# Patient Record
Sex: MALE | Race: WHITE | Employment: OTHER | URBAN - METROPOLITAN AREA
[De-identification: names, ages, dates, MRNs, and addresses within clinical notes are randomized per-mention and may not be internally consistent; named-entity substitution may affect disease eponyms.]

---

## 2017-01-24 ENCOUNTER — THERAPY VISIT (OUTPATIENT)
Dept: CHIROPRACTIC MEDICINE | Facility: CLINIC | Age: 62
End: 2017-01-24
Payer: COMMERCIAL

## 2017-01-24 DIAGNOSIS — M99.03 SOMATIC DYSFUNCTION OF LUMBAR REGION: ICD-10-CM

## 2017-01-24 DIAGNOSIS — M99.02 THORACIC SEGMENT DYSFUNCTION: ICD-10-CM

## 2017-01-24 DIAGNOSIS — M54.2 CERVICALGIA: Primary | ICD-10-CM

## 2017-01-24 PROCEDURE — 98941 CHIROPRACT MANJ 3-4 REGIONS: CPT | Performed by: CHIROPRACTOR

## 2017-01-24 NOTE — PROGRESS NOTES
VISIT 1 2017    SUBJECTIVE:  Thierry RTC with overall improvement in neck and back pain. Overall a good month, with mild achy pain neck and lower back.  Lower back and neck pain are gradually increasing in tightness over the last week again. States cold weather and shoveling snow may be contributing.     OBJECTIVE:  C2-3, T5-6 and left and right SIJ dysfunction.   Hypertonic c/t paraspinals, L/S paraspinals B 2/5  ASSESSMENT:   3-region spinal pain   stable  PLAN:   CMT - prone to the mid thoracic, drop x1 to the right and left SIJ, supine to the cervical region  PRN care plan.

## 2017-01-24 NOTE — MR AVS SNAPSHOT
"              After Visit Summary   1/24/2017    Thierry Murrieta    MRN: [id number]           Patient Information     Date Of Birth          1955        Visit Information        Provider Department      1/24/2017 9:00 AM Sintia Bob DC [facility] [place]        Today's Diagnoses     Cervicalgia    -  1     Thoracic segment dysfunction         Somatic dysfunction of lumbar region            Follow-ups after your visit        Your next 10 appointments already scheduled     Feb 21, 2017 10:30 AM   INDRA Chiropractor with Sintia Bob DC   [place] [facility] [place] ([facility] [place]  )    [address]  [address]  [place] [id number] [phone]              Who to contact     If you have questions or need follow up information about today's clinic visit or your schedule please contact [facility] directly at [phone].  Normal or non-critical lab and imaging results will be communicated to you by MyChart, letter or phone within 4 business days after the clinic has received the results. If you do not hear from us within 7 days, please contact the clinic through [employer] or phone. If you have a critical or abnormal lab result, we will notify you by phone as soon as possible.  Submit refill requests through [employer] or call your pharmacy and they will forward the refill request to us. Please allow 3 business days for your refill to be completed.          Additional Information About Your Visit        MyChart Information     [employer] lets you send messages to your doctor, view your test results, renew your prescriptions, schedule appointments and more. To sign up, go to [email or web address] . Click on \"Log in\" on the left side of the screen, which will take you to the Welcome page. Then click on \"Sign up Now\" on the right side of the page.     You will be asked to enter the access code listed below, as well as some personal " information. Please follow the directions to create your username and password.     Your access code is: [email or web address]  Expires: 3/20/2017  9:20 AM     Your access code will  in 90 days. If you need help or a new code, please call your [place] clinic or [phone].        Care EveryWhere ID     This is your Care EveryWhere ID. This could be used by other organizations to access your [place] medical records  [id number]         Blood Pressure from Last 3 Encounters:   No data found for BP    Weight from Last 3 Encounters:   10/03/05 92.987 kg (205 lb)              We Performed the Following     CHIROPRAC MANIP,SPINAL,3-4 REGIONS        Primary Care Provider    None Specified       No primary provider on file.        Thank you!     Thank you for choosing [facility]  for your care. Our goal is always to provide you with excellent care. Hearing back from our patients is one way we can continue to improve our services. Please take a few minutes to complete the written survey that you may receive in the mail after your visit with us. Thank you!             Your Updated Medication List - Protect others around you: Learn how to safely use, store and throw away your medicines at www.disposemymeds.org.      Notice  As of 2017  9:24 AM    You have not been prescribed any medications.

## 2017-02-21 ENCOUNTER — THERAPY VISIT (OUTPATIENT)
Dept: CHIROPRACTIC MEDICINE | Facility: CLINIC | Age: 62
End: 2017-02-21
Payer: COMMERCIAL

## 2017-02-21 DIAGNOSIS — M54.2 CERVICALGIA: Primary | ICD-10-CM

## 2017-02-21 DIAGNOSIS — G89.29 CHRONIC BILATERAL LOW BACK PAIN WITHOUT SCIATICA: ICD-10-CM

## 2017-02-21 DIAGNOSIS — M54.50 CHRONIC BILATERAL LOW BACK PAIN WITHOUT SCIATICA: ICD-10-CM

## 2017-02-21 DIAGNOSIS — M99.02 THORACIC SEGMENT DYSFUNCTION: ICD-10-CM

## 2017-02-21 PROCEDURE — 98941 CHIROPRACT MANJ 3-4 REGIONS: CPT | Mod: AT | Performed by: CHIROPRACTOR

## 2017-02-21 NOTE — PROGRESS NOTES
VISIT 2 2017    SUBJECTIVE:  Thierry RTC with overall improvement in neck and back pain. Overall a good month, with mild achy pain neck and lower back.  Lower back and upper neck pain are gradually increasing in tightness over the last week again.    OBJECTIVE:  C2-3, T5-6 and left and right SIJ dysfunction.   Hypertonic c/t paraspinals, L/S paraspinals B 2/5  ASSESSMENT:   3-region spinal pain   stable  PLAN:   CMT - prone to the mid thoracic, drop x1 to the right and left SIJ, supine to the cervical region  PRN care plan.

## 2017-03-21 ENCOUNTER — THERAPY VISIT (OUTPATIENT)
Dept: CHIROPRACTIC MEDICINE | Facility: CLINIC | Age: 62
End: 2017-03-21
Payer: COMMERCIAL

## 2017-03-21 DIAGNOSIS — G89.29 CHRONIC BILATERAL LOW BACK PAIN WITHOUT SCIATICA: ICD-10-CM

## 2017-03-21 DIAGNOSIS — M54.50 CHRONIC BILATERAL LOW BACK PAIN WITHOUT SCIATICA: ICD-10-CM

## 2017-03-21 DIAGNOSIS — M54.2 CERVICALGIA: Primary | ICD-10-CM

## 2017-03-21 DIAGNOSIS — M99.02 THORACIC SEGMENT DYSFUNCTION: ICD-10-CM

## 2017-03-21 PROCEDURE — 98941 CHIROPRACT MANJ 3-4 REGIONS: CPT | Mod: AT | Performed by: CHIROPRACTOR

## 2017-03-21 NOTE — PROGRESS NOTES
VISIT 3 2017    SUBJECTIVE:  Thierry RTC with overall improvement in neck and back pain. Overall a good month, states having a slight increase stiffness throughout his neck and back. States it might be the cold weather or not being as active.     OBJECTIVE:  C2-3, T5-6 and left and right SIJ dysfunction.   Hypertonic c/t paraspinals, L/S paraspinals B 2/5  ASSESSMENT:   3-region spinal pain   stable  PLAN:   CMT - prone to the mid thoracic, drop x1 to the right and left SIJ, supine to the cervical region  PRN care plan.

## 2017-04-20 ENCOUNTER — THERAPY VISIT (OUTPATIENT)
Dept: CHIROPRACTIC MEDICINE | Facility: CLINIC | Age: 62
End: 2017-04-20
Payer: COMMERCIAL

## 2017-04-20 DIAGNOSIS — M99.02 THORACIC SEGMENT DYSFUNCTION: ICD-10-CM

## 2017-04-20 DIAGNOSIS — M54.50 CHRONIC BILATERAL LOW BACK PAIN WITHOUT SCIATICA: ICD-10-CM

## 2017-04-20 DIAGNOSIS — M54.2 CERVICALGIA: Primary | ICD-10-CM

## 2017-04-20 DIAGNOSIS — G89.29 CHRONIC BILATERAL LOW BACK PAIN WITHOUT SCIATICA: ICD-10-CM

## 2017-04-20 PROCEDURE — 98941 CHIROPRACT MANJ 3-4 REGIONS: CPT | Mod: AT | Performed by: CHIROPRACTOR

## 2017-04-20 NOTE — PROGRESS NOTES
VISIT 4 2017    SUBJECTIVE:  Thierry RTC with overall slight improvement in neck and back pain. Overall a good month, states having a slight increase stiffness throughout his neck and right lower back in the last week. States it might be the wet weather and yard work.     OBJECTIVE:  C2-3, T5-6 and left and right SIJ dysfunction.   Hypertonic c/t paraspinals, L/S paraspinals B 2/5  ASSESSMENT:   3-region spinal pain   stable  PLAN:   CMT - prone to the mid thoracic, drop x1 to the right and left SIJ, supine to the cervical region  PRN care plan.

## 2017-05-18 ENCOUNTER — THERAPY VISIT (OUTPATIENT)
Dept: CHIROPRACTIC MEDICINE | Facility: CLINIC | Age: 62
End: 2017-05-18
Payer: COMMERCIAL

## 2017-05-18 DIAGNOSIS — M54.50 LUMBAGO: ICD-10-CM

## 2017-05-18 DIAGNOSIS — M54.2 CERVICALGIA: Primary | ICD-10-CM

## 2017-05-18 DIAGNOSIS — M99.02 THORACIC SEGMENT DYSFUNCTION: ICD-10-CM

## 2017-05-18 PROCEDURE — 98941 CHIROPRACT MANJ 3-4 REGIONS: CPT | Mod: AT | Performed by: CHIROPRACTOR

## 2017-05-18 NOTE — PROGRESS NOTES
VISIT 6 2017    SUBJECTIVE:  Thierry RTC with overall slight improvement in neck and back pain. Overall a good month, states having a slight increase stiffness throughout his neck and right lower back in the last week. States it might be the wet weather and yard work.     OBJECTIVE:  C2-3, T5-6 and left and right SIJ dysfunction.   Hypertonic c/t paraspinals, L/S paraspinals B 2/5  ASSESSMENT:   3-region spinal pain   stable  PLAN:   CMT - prone to the mid thoracic, drop x1 to the right and left SIJ, supine to the cervical region  PRN care plan.

## 2017-06-22 ENCOUNTER — THERAPY VISIT (OUTPATIENT)
Dept: CHIROPRACTIC MEDICINE | Facility: CLINIC | Age: 62
End: 2017-06-22
Payer: COMMERCIAL

## 2017-06-22 DIAGNOSIS — G89.29 CHRONIC BILATERAL LOW BACK PAIN WITHOUT SCIATICA: ICD-10-CM

## 2017-06-22 DIAGNOSIS — M99.02 THORACIC SEGMENT DYSFUNCTION: ICD-10-CM

## 2017-06-22 DIAGNOSIS — M99.01 CERVICAL SEGMENT DYSFUNCTION: ICD-10-CM

## 2017-06-22 DIAGNOSIS — M54.50 CHRONIC BILATERAL LOW BACK PAIN WITHOUT SCIATICA: ICD-10-CM

## 2017-06-22 DIAGNOSIS — M99.03 SOMATIC DYSFUNCTION OF LUMBAR REGION: Primary | ICD-10-CM

## 2017-06-22 PROCEDURE — 98941 CHIROPRACT MANJ 3-4 REGIONS: CPT | Mod: AT | Performed by: CHIROPRACTOR

## 2017-06-22 NOTE — PROGRESS NOTES
VISIT 7 2017    SUBJECTIVE:  Thierry RTC with a slight increase in neck and back pain. Overall a good month, states having a slight increase stiffness throughout his neck and right lower back in the last week.      OBJECTIVE:  C2-3, T5-6 and left and right SIJ dysfunction.   Hypertonic c/t paraspinals, L/S paraspinals B 2/5  ASSESSMENT:   3-region spinal pain   stable  PLAN:   CMT - prone to the mid thoracic, drop x1 to the right and left SIJ, supine to the cervical region  PRN care plan.

## 2017-07-20 ENCOUNTER — THERAPY VISIT (OUTPATIENT)
Dept: CHIROPRACTIC MEDICINE | Facility: CLINIC | Age: 62
End: 2017-07-20
Payer: COMMERCIAL

## 2017-07-20 DIAGNOSIS — M99.02 THORACIC SEGMENT DYSFUNCTION: ICD-10-CM

## 2017-07-20 DIAGNOSIS — G89.29 CHRONIC BILATERAL LOW BACK PAIN WITHOUT SCIATICA: ICD-10-CM

## 2017-07-20 DIAGNOSIS — M54.50 CHRONIC BILATERAL LOW BACK PAIN WITHOUT SCIATICA: ICD-10-CM

## 2017-07-20 DIAGNOSIS — M54.2 CERVICALGIA: Primary | ICD-10-CM

## 2017-07-20 PROCEDURE — 98941 CHIROPRACT MANJ 3-4 REGIONS: CPT | Mod: AT | Performed by: CHIROPRACTOR

## 2017-07-20 NOTE — PROGRESS NOTES
VISIT 8 2017    SUBJECTIVE:  Thierry RTC with a slight increase in neck and back pain. Overall a good month, states having a slight increase stiffness throughout his neck and right lower back in the last week.      OBJECTIVE:  C2-3, T5-6 and left and right SIJ dysfunction.   Hypertonic c/t paraspinals, L/S paraspinals B 2/5  ASSESSMENT:   3-region spinal pain   stable  PLAN:   CMT - prone to the mid thoracic, drop x1 to the right and left SIJ, supine to the cervical region  PRN care plan.   VISIT 7 2017    SUBJECTIVE:  Thierry RTC with a slight increase in neck and back pain. Overall a good month, states having a slight increase stiffness throughout his neck and right lower back in the last week.      OBJECTIVE:  C2-3, T5-6 and left and right SIJ dysfunction.   Hypertonic c/t paraspinals, L/S paraspinals B 2/5  ASSESSMENT:   3-region spinal pain   stable  PLAN:   CMT - prone to the mid thoracic, drop x1 to the right and left SIJ, supine to the cervical region  PRN care plan.

## 2017-08-24 ENCOUNTER — THERAPY VISIT (OUTPATIENT)
Dept: CHIROPRACTIC MEDICINE | Facility: CLINIC | Age: 62
End: 2017-08-24
Payer: COMMERCIAL

## 2017-08-24 DIAGNOSIS — M54.2 CERVICALGIA: Primary | ICD-10-CM

## 2017-08-24 DIAGNOSIS — M99.03 SOMATIC DYSFUNCTION OF LUMBAR REGION: ICD-10-CM

## 2017-08-24 DIAGNOSIS — M99.02 THORACIC SEGMENT DYSFUNCTION: ICD-10-CM

## 2017-08-24 PROCEDURE — 98941 CHIROPRACT MANJ 3-4 REGIONS: CPT | Mod: AT | Performed by: CHIROPRACTOR

## 2017-08-24 NOTE — PROGRESS NOTES
VISIT 9 2017    SUBJECTIVE:  Thierry RTC with a slight increase in neck and back pain. Overall a good month, states having a slight increase stiffness throughout his neck and right lower back in the last few days. States he did have a massage this past week which was also helpful.      OBJECTIVE:  C2-3, T5-6 and left and right SIJ dysfunction.   Hypertonic c/t paraspinals, L/S paraspinals B 2/5  ASSESSMENT:   3-region spinal pain   stable  PLAN:   CMT - prone to the mid thoracic, drop x1 to the right and left SIJ, supine to the cervical region  PRN care plan.   VISIT 7 2017    SUBJECTIVE:  Thierry RTC with a slight increase in neck and back pain. Overall a good month, states having a slight increase stiffness throughout his neck and right lower back in the last week.      OBJECTIVE:  C2-3, T5-6 and left and right SIJ dysfunction.   Hypertonic c/t paraspinals, L/S paraspinals B 2/5  ASSESSMENT:   3-region spinal pain   stable  PLAN:   CMT - prone to the mid thoracic, drop x1 to the right and left SIJ, supine to the cervical region  PRN care plan.

## 2017-09-21 ENCOUNTER — THERAPY VISIT (OUTPATIENT)
Dept: CHIROPRACTIC MEDICINE | Facility: CLINIC | Age: 62
End: 2017-09-21
Payer: COMMERCIAL

## 2017-09-21 DIAGNOSIS — M99.01 CERVICAL SEGMENT DYSFUNCTION: ICD-10-CM

## 2017-09-21 DIAGNOSIS — M99.02 THORACIC SEGMENT DYSFUNCTION: ICD-10-CM

## 2017-09-21 DIAGNOSIS — M99.03 SOMATIC DYSFUNCTION OF LUMBAR REGION: Primary | ICD-10-CM

## 2017-09-21 DIAGNOSIS — G89.29 CHRONIC BILATERAL LOW BACK PAIN WITHOUT SCIATICA: ICD-10-CM

## 2017-09-21 DIAGNOSIS — M54.50 CHRONIC BILATERAL LOW BACK PAIN WITHOUT SCIATICA: ICD-10-CM

## 2017-09-21 PROCEDURE — 98941 CHIROPRACT MANJ 3-4 REGIONS: CPT | Mod: AT | Performed by: CHIROPRACTOR

## 2017-09-21 NOTE — PROGRESS NOTES
VISIT 10 2017    SUBJECTIVE:  Thierry RTC with a slight improvement in neck and back pain since the last visit. Overall a good month, states having a slight increase stiffness throughout his neck and right lower back in the last few days. States he did have a massage this past week which was also helpful.      OBJECTIVE:  C2-3, T5-6 and left and right SIJ dysfunction.   Hypertonic c/t paraspinals, L/S paraspinals B 2/5  ASSESSMENT:   3-region spinal pain   stable  PLAN:   CMT - prone to the mid thoracic, drop x1 to the right and left SIJ, supine to the cervical region  PRN care plan.   VISIT 7 2017    SUBJECTIVE:  Thierry RTC with a slight increase in neck and back pain. Overall a good month, states having a slight increase stiffness throughout his neck and right lower back in the last week.      OBJECTIVE:  C2-3, T5-6 and left and right SIJ dysfunction.   Hypertonic c/t paraspinals, L/S paraspinals B 2/5  ASSESSMENT:   3-region spinal pain   stable  PLAN:   CMT - prone to the mid thoracic, drop x1 to the right and left SIJ, supine to the cervical region  PRN care plan.

## 2017-10-19 ENCOUNTER — THERAPY VISIT (OUTPATIENT)
Dept: CHIROPRACTIC MEDICINE | Facility: CLINIC | Age: 62
End: 2017-10-19
Payer: COMMERCIAL

## 2017-10-19 DIAGNOSIS — G89.29 CHRONIC BILATERAL LOW BACK PAIN WITHOUT SCIATICA: ICD-10-CM

## 2017-10-19 DIAGNOSIS — M54.2 CERVICALGIA: Primary | ICD-10-CM

## 2017-10-19 DIAGNOSIS — M99.02 THORACIC SEGMENT DYSFUNCTION: ICD-10-CM

## 2017-10-19 DIAGNOSIS — M54.50 CHRONIC BILATERAL LOW BACK PAIN WITHOUT SCIATICA: ICD-10-CM

## 2017-10-19 PROCEDURE — 98941 CHIROPRACT MANJ 3-4 REGIONS: CPT | Mod: AT | Performed by: CHIROPRACTOR

## 2017-10-19 NOTE — PROGRESS NOTES
VISIT 11 2017    SUBJECTIVE:  Thierry RTC with a slight improvement in neck and back pain since the last visit. Overall a good month, states having a slight increase stiffness throughout his neck, mid-back, and right lower back in the last week. Patient reports his left knee is achy/sore and this might be affecting his gait.     OBJECTIVE:  C2-3, T5-6 and left and right SIJ dysfunction.   Hypertonic c/t paraspinals, L/S paraspinals B 2/5  ASSESSMENT:   3-region spinal pain   stable  PLAN:   CMT - prone to the mid thoracic, drop x1 to the right and left SIJ, supine to the cervical region  PRN care plan.   VISIT 7 2017    SUBJECTIVE:  Thierry RTC with a slight increase in neck and back pain. Overall a good month, states having a slight increase stiffness throughout his neck and right lower back in the last week.      OBJECTIVE:  C2-3, T5-6 and left and right SIJ dysfunction.   Hypertonic c/t paraspinals, L/S paraspinals B 2/5  ASSESSMENT:   3-region spinal pain   stable  PLAN:   CMT - prone to the mid thoracic, drop x1 to the right and left SIJ, supine to the cervical region  PRN care plan.

## 2017-11-16 ENCOUNTER — THERAPY VISIT (OUTPATIENT)
Dept: CHIROPRACTIC MEDICINE | Facility: CLINIC | Age: 62
End: 2017-11-16
Payer: COMMERCIAL

## 2017-11-16 DIAGNOSIS — M99.01 CERVICAL SEGMENT DYSFUNCTION: ICD-10-CM

## 2017-11-16 DIAGNOSIS — M99.03 SOMATIC DYSFUNCTION OF LUMBAR REGION: Primary | ICD-10-CM

## 2017-11-16 DIAGNOSIS — M54.50 LUMBAGO: ICD-10-CM

## 2017-11-16 DIAGNOSIS — M54.2 CERVICALGIA: ICD-10-CM

## 2017-11-16 PROCEDURE — 98941 CHIROPRACT MANJ 3-4 REGIONS: CPT | Mod: AT | Performed by: CHIROPRACTOR

## 2017-11-16 NOTE — PROGRESS NOTES
VISIT 12 2017    SUBJECTIVE:  Thierry RTC with a slight improvement in neck and back pain since the last visit. Overall a good month, states having a slight increase stiffness throughout his neck,and lower back in the last week. Patient reports his left knee is achy/sore and this might be affecting his gait. States he did see an orthopedic specialist who did an x-ray. States he said there was nothing surgical and may have some osteoarthritis in the knee. The doctor recommended exercises for the knee.     OBJECTIVE:  C2-3, T5-6 and left and right SIJ dysfunction.   Hypertonic c/t paraspinals, L/S paraspinals B 2/5  ASSESSMENT:   3-region spinal pain   stable  PLAN:   CMT - prone to the mid thoracic, drop x1 to the right and left SIJ, supine to the cervical region  PRN care plan.

## 2017-12-21 ENCOUNTER — THERAPY VISIT (OUTPATIENT)
Dept: CHIROPRACTIC MEDICINE | Facility: CLINIC | Age: 62
End: 2017-12-21
Payer: COMMERCIAL

## 2017-12-21 DIAGNOSIS — M54.2 CERVICALGIA: ICD-10-CM

## 2017-12-21 DIAGNOSIS — M99.02 THORACIC SEGMENT DYSFUNCTION: ICD-10-CM

## 2017-12-21 DIAGNOSIS — M99.03 SOMATIC DYSFUNCTION OF LUMBAR REGION: Primary | ICD-10-CM

## 2017-12-21 DIAGNOSIS — M54.50 LUMBAGO: ICD-10-CM

## 2017-12-21 PROCEDURE — 98941 CHIROPRACT MANJ 3-4 REGIONS: CPT | Mod: AT | Performed by: CHIROPRACTOR

## 2017-12-21 NOTE — PROGRESS NOTES
VISIT 13 2017    SUBJECTIVE:  Thierry RTC with a slight improvement in neck and back pain since the last visit. Overall a good month, states having a slight increase stiffness throughout his neck,and lower back in the last week.     OBJECTIVE:  C2-3, T5-6 and left and right SIJ dysfunction.   Hypertonic c/t paraspinals, L/S paraspinals B 2/5  ASSESSMENT:   3-region spinal pain   stable  PLAN:   CMT - prone to the mid thoracic, drop x1 to the right and left SIJ, supine to the cervical region  PRN care plan.

## 2018-01-23 ENCOUNTER — THERAPY VISIT (OUTPATIENT)
Dept: CHIROPRACTIC MEDICINE | Facility: CLINIC | Age: 63
End: 2018-01-23
Payer: COMMERCIAL

## 2018-01-23 DIAGNOSIS — M54.2 CERVICALGIA: ICD-10-CM

## 2018-01-23 DIAGNOSIS — M54.50 CHRONIC BILATERAL LOW BACK PAIN WITHOUT SCIATICA: Primary | ICD-10-CM

## 2018-01-23 DIAGNOSIS — M99.02 THORACIC SEGMENT DYSFUNCTION: ICD-10-CM

## 2018-01-23 DIAGNOSIS — G89.29 CHRONIC BILATERAL LOW BACK PAIN WITHOUT SCIATICA: Primary | ICD-10-CM

## 2018-01-23 PROCEDURE — 98941 CHIROPRACT MANJ 3-4 REGIONS: CPT | Mod: AT | Performed by: CHIROPRACTOR

## 2018-01-23 NOTE — PROGRESS NOTES
VISIT 1 2018    SUBJECTIVE:  Thierry RTC with a slight improvement in neck and back pain since the last visit. Overall a good month, states having a slight increase stiffness throughout his neck,and lower back in the last week. States having a weekly massage with [facility]. Also started acupuncture in the last week.    OBJECTIVE:  C2-3, T5-6 and left and right SIJ dysfunction.   Hypertonic c/t paraspinals, L/S paraspinals B 2/5  ASSESSMENT:   3-region spinal pain   stable  PLAN:   CMT - prone to the mid thoracic, drop x1 to the right and left SIJ, supine to the cervical region  PRN care plan.

## 2018-02-27 ENCOUNTER — THERAPY VISIT (OUTPATIENT)
Dept: CHIROPRACTIC MEDICINE | Facility: CLINIC | Age: 63
End: 2018-02-27
Payer: COMMERCIAL

## 2018-02-27 DIAGNOSIS — M54.50 CHRONIC BILATERAL LOW BACK PAIN WITHOUT SCIATICA: ICD-10-CM

## 2018-02-27 DIAGNOSIS — M99.02 THORACIC SEGMENT DYSFUNCTION: ICD-10-CM

## 2018-02-27 DIAGNOSIS — M99.01 CERVICAL SEGMENT DYSFUNCTION: ICD-10-CM

## 2018-02-27 DIAGNOSIS — G89.29 CHRONIC BILATERAL LOW BACK PAIN WITHOUT SCIATICA: ICD-10-CM

## 2018-02-27 DIAGNOSIS — M99.03 SOMATIC DYSFUNCTION OF LUMBAR REGION: Primary | ICD-10-CM

## 2018-02-27 DIAGNOSIS — M54.2 CERVICALGIA: ICD-10-CM

## 2018-02-27 PROCEDURE — 98941 CHIROPRACT MANJ 3-4 REGIONS: CPT | Mod: AT | Performed by: CHIROPRACTOR

## 2018-02-27 NOTE — PROGRESS NOTES
VISIT 2 2018    SUBJECTIVE:IRENE IS A PATIENT OF DR. BOB.     Irene RTC with stiffness in the neck and decreased ability to turn the head in either direction. He denies a specific injury or event that may have triggered the issue.  He states it may be due to excessive computer activities. The pt states he feels imbalanced when walking due to decreased ROM in the L sacral area. The pt denies HA..    OBJECTIVE:  C2-3, T5-6 and left and right SIJ dysfunction.   Hypertonic c/t paraspinals, L/S paraspinals B 2/5  ASSESSMENT:   3-region spinal pain   stable  PLAN:   CMT - prone to the mid thoracic, drop x1 to the right and left SIJ, supine to the cervical region  PRN care plan.    Return to Dr. Bob in the following month.

## 2018-04-03 ENCOUNTER — THERAPY VISIT (OUTPATIENT)
Dept: CHIROPRACTIC MEDICINE | Facility: CLINIC | Age: 63
End: 2018-04-03
Payer: COMMERCIAL

## 2018-04-03 DIAGNOSIS — M99.02 THORACIC SEGMENT DYSFUNCTION: ICD-10-CM

## 2018-04-03 DIAGNOSIS — M54.50 LUMBAGO: ICD-10-CM

## 2018-04-03 DIAGNOSIS — M54.2 CERVICALGIA: Primary | ICD-10-CM

## 2018-04-03 PROCEDURE — 98941 CHIROPRACT MANJ 3-4 REGIONS: CPT | Mod: AT | Performed by: CHIROPRACTOR

## 2018-04-03 NOTE — PROGRESS NOTES
VISIT 3 2018    SUBJECTIVE:  Thierry RTC with a slight improvement in neck and back pain since the last visit. Overall a good month, states having a slight increase stiffness throughout his neck,and lower back in the last week. States having a weekly massage with [facility]. Also is doing acupuncture.     OBJECTIVE:  C2-3, T5-6 and left and right SIJ dysfunction.   Hypertonic c/t paraspinals, L/S paraspinals B 2/5  ASSESSMENT:   3-region spinal pain   stable  PLAN:   CMT - prone to the mid thoracic, drop x1 to the right and left SIJ, supine to the cervical region  PRN care plan.

## 2018-05-24 ENCOUNTER — THERAPY VISIT (OUTPATIENT)
Dept: CHIROPRACTIC MEDICINE | Facility: CLINIC | Age: 63
End: 2018-05-24
Payer: COMMERCIAL

## 2018-05-24 DIAGNOSIS — M54.2 CERVICALGIA: ICD-10-CM

## 2018-05-24 DIAGNOSIS — M99.02 THORACIC SEGMENT DYSFUNCTION: ICD-10-CM

## 2018-05-24 DIAGNOSIS — G89.29 CHRONIC BILATERAL LOW BACK PAIN WITHOUT SCIATICA: ICD-10-CM

## 2018-05-24 DIAGNOSIS — M99.01 CERVICAL SEGMENT DYSFUNCTION: Primary | ICD-10-CM

## 2018-05-24 DIAGNOSIS — M54.50 CHRONIC BILATERAL LOW BACK PAIN WITHOUT SCIATICA: ICD-10-CM

## 2018-05-24 PROCEDURE — 98941 CHIROPRACT MANJ 3-4 REGIONS: CPT | Mod: AT | Performed by: CHIROPRACTOR

## 2018-05-24 NOTE — PROGRESS NOTES
VISIT 4 2018    SUBJECTIVE:  Thierry RTC with a slight improvement in neck and back pain since the last visit. Overall a good month, states having a slight increase stiffness throughout his neck,and lower back in the last week. States having a weekly massage with [facility]. Also is doing acupuncture. Patient reports he still has continued bilateral plantar fascitis and achy pain into his legs. States she is working with a physical therapist a the [facility] on these symptoms.     OBJECTIVE:  C2-3, T5-6 and left and right SIJ dysfunction.   Hypertonic c/t paraspinals, L/S paraspinals B 2/5  ASSESSMENT:   3-region spinal pain   stable  PLAN:   CMT - prone to the mid thoracic, drop x1 to the right and left SIJ, supine to the cervical region  PRN care plan.

## 2018-06-21 ENCOUNTER — THERAPY VISIT (OUTPATIENT)
Dept: CHIROPRACTIC MEDICINE | Facility: CLINIC | Age: 63
End: 2018-06-21
Payer: COMMERCIAL

## 2018-06-21 DIAGNOSIS — M54.2 CERVICALGIA: ICD-10-CM

## 2018-06-21 DIAGNOSIS — M54.50 LUMBAGO: ICD-10-CM

## 2018-06-21 DIAGNOSIS — M99.02 THORACIC SEGMENT DYSFUNCTION: ICD-10-CM

## 2018-06-21 DIAGNOSIS — M99.03 SOMATIC DYSFUNCTION OF LUMBAR REGION: Primary | ICD-10-CM

## 2018-06-21 PROCEDURE — 98941 CHIROPRACT MANJ 3-4 REGIONS: CPT | Mod: AT | Performed by: CHIROPRACTOR

## 2018-06-21 NOTE — PROGRESS NOTES
VISIT 5 2018    SUBJECTIVE:  Thierry RTC with a slight improvement in neck and back pain since the last visit. Overall a good month, states having a slight increase stiffness throughout his neck,and lower back in the last week. States having a weekly massage with [facility]. Also is doing acupuncture. Patient reports he still has continued bilateral plantar fascitis and achy pain into his legs. States he is continuing to work with a physical therapist a [facility] [employer] on these symptoms.     OBJECTIVE:  C2-3, T5-6 and left and right SIJ dysfunction.   Hypertonic c/t paraspinals, L/S paraspinals B 2/5  ASSESSMENT:   3-region spinal pain   stable  PLAN:   CMT - prone to the mid thoracic, drop x1 to the right and left SIJ, supine to the cervical region  PRN care plan.

## 2018-07-17 ENCOUNTER — THERAPY VISIT (OUTPATIENT)
Dept: CHIROPRACTIC MEDICINE | Facility: CLINIC | Age: 63
End: 2018-07-17
Payer: COMMERCIAL

## 2018-07-17 DIAGNOSIS — M54.2 CERVICALGIA: Primary | ICD-10-CM

## 2018-07-17 DIAGNOSIS — M99.02 THORACIC SEGMENT DYSFUNCTION: ICD-10-CM

## 2018-07-17 DIAGNOSIS — M54.50 LUMBAGO: ICD-10-CM

## 2018-07-17 PROCEDURE — 98941 CHIROPRACT MANJ 3-4 REGIONS: CPT | Mod: AT | Performed by: CHIROPRACTOR

## 2018-07-17 NOTE — PROGRESS NOTES
VISIT 6 2018    SUBJECTIVE:  Thierry RTC with a slight improvement in neck and back pain since the last visit. Overall a good month, states having a slight increase stiffness throughout his neck,and lower back in the last week. States having a weekly massage with [facility]. Also is doing acupuncture. Patient reports he still been working on bilateral plantar fascitis and achy pain into his legs in physical therapy.    OBJECTIVE:  C2-3, T5-6 and left and right SIJ dysfunction.   Hypertonic c/t paraspinals, L/S paraspinals B 2/5  ASSESSMENT:   3-region spinal pain   stable  PLAN:   CMT - prone to the mid thoracic, drop x1 to the right and left SIJ, supine to the cervical region  PRN care plan.

## 2018-08-16 ENCOUNTER — THERAPY VISIT (OUTPATIENT)
Dept: CHIROPRACTIC MEDICINE | Facility: CLINIC | Age: 63
End: 2018-08-16
Payer: COMMERCIAL

## 2018-08-16 DIAGNOSIS — M54.2 CERVICALGIA: Primary | ICD-10-CM

## 2018-08-16 DIAGNOSIS — M54.50 LUMBAGO: ICD-10-CM

## 2018-08-16 DIAGNOSIS — M99.02 THORACIC SEGMENT DYSFUNCTION: ICD-10-CM

## 2018-08-16 PROCEDURE — 98941 CHIROPRACT MANJ 3-4 REGIONS: CPT | Mod: AT | Performed by: CHIROPRACTOR

## 2018-08-16 NOTE — PROGRESS NOTES
VISIT 7 2018    SUBJECTIVE:  Thierry RTC with a slight improvement in neck and back pain since the last visit. Overall a good month, states having a slight increase stiffness throughout his neck,and lower back in the last week. States having a weekly massage with [facility]. Also is doing acupuncture. Patient reports overall improvement.     OBJECTIVE:  C2-3, T5-6 and left and right SIJ dysfunction.   Hypertonic c/t paraspinals, L/S paraspinals B 2/5  ASSESSMENT:   3-region spinal pain   stable  PLAN:   CMT - prone to the mid thoracic, drop x1 to the right and left SIJ, supine to the cervical region  PRN care plan.

## 2018-09-20 ENCOUNTER — THERAPY VISIT (OUTPATIENT)
Dept: CHIROPRACTIC MEDICINE | Facility: CLINIC | Age: 63
End: 2018-09-20
Payer: COMMERCIAL

## 2018-09-20 DIAGNOSIS — M54.50 LUMBAGO: ICD-10-CM

## 2018-09-20 DIAGNOSIS — M54.2 CERVICALGIA: Primary | ICD-10-CM

## 2018-09-20 DIAGNOSIS — M99.02 THORACIC SEGMENT DYSFUNCTION: ICD-10-CM

## 2018-09-20 PROCEDURE — 98941 CHIROPRACT MANJ 3-4 REGIONS: CPT | Mod: AT | Performed by: CHIROPRACTOR

## 2018-09-20 NOTE — PROGRESS NOTES
VISIT 8 2018    SUBJECTIVE:  Thierry RTC with a slight improvement in neck and back pain since the last visit. Overall a good month, states having a slight increase stiffness throughout his neck,and lower back in the last week. States having a weekly massage with [facility]. Also is doing acupuncture. Patient reports overall improvement. Patient reports having continued knee pain, states he is doing his exercises given in PT.  We discussed he see his PCP about this.     OBJECTIVE:  C2-3, T5-6 and left and right SIJ dysfunction.   Hypertonic c/t paraspinals, L/S paraspinals B 2/5  ASSESSMENT:   3-region spinal pain   stable  PLAN:   CMT - prone to the mid thoracic, drop x1 to the right and left SIJ, supine to the cervical region  PRN care plan.

## 2018-10-16 ENCOUNTER — THERAPY VISIT (OUTPATIENT)
Dept: CHIROPRACTIC MEDICINE | Facility: CLINIC | Age: 63
End: 2018-10-16
Payer: COMMERCIAL

## 2018-10-16 DIAGNOSIS — M99.03 SOMATIC DYSFUNCTION OF LUMBAR REGION: ICD-10-CM

## 2018-10-16 DIAGNOSIS — M54.2 CERVICALGIA: Primary | ICD-10-CM

## 2018-10-16 DIAGNOSIS — M99.02 THORACIC SEGMENT DYSFUNCTION: ICD-10-CM

## 2018-10-16 DIAGNOSIS — M54.50 LUMBAGO: ICD-10-CM

## 2018-10-16 PROCEDURE — 98941 CHIROPRACT MANJ 3-4 REGIONS: CPT | Mod: AT | Performed by: CHIROPRACTOR

## 2018-10-16 NOTE — PROGRESS NOTES
VISIT 9 2018    SUBJECTIVE:  Thierry RTC with a slight improvement in neck and back pain since the last visit. Overall a good month, states having a slight increase stiffness throughout his neck,and lower back in the last week. States having a weekly massage with [facility]. Also is doing acupuncture. Patient reports overall improvement. Patient reports he has switched shoes and that has made dramatic improvement with his plantar fascitis and knee pain.     OBJECTIVE:  C2-3, T5-6 and left and right SIJ dysfunction.   Hypertonic c/t paraspinals, L/S paraspinals B 2/5  ASSESSMENT:   3-region spinal pain   stable  PLAN:   CMT - prone to the mid thoracic, drop x1 to the right and left SIJ, supine to the cervical region  PRN care plan.

## 2018-11-20 ENCOUNTER — THERAPY VISIT (OUTPATIENT)
Dept: CHIROPRACTIC MEDICINE | Facility: CLINIC | Age: 63
End: 2018-11-20
Payer: COMMERCIAL

## 2018-11-20 DIAGNOSIS — G89.29 CHRONIC BILATERAL LOW BACK PAIN WITHOUT SCIATICA: Primary | ICD-10-CM

## 2018-11-20 DIAGNOSIS — M99.02 THORACIC SEGMENT DYSFUNCTION: ICD-10-CM

## 2018-11-20 DIAGNOSIS — M54.2 CERVICALGIA: ICD-10-CM

## 2018-11-20 DIAGNOSIS — M54.50 CHRONIC BILATERAL LOW BACK PAIN WITHOUT SCIATICA: Primary | ICD-10-CM

## 2018-11-20 PROCEDURE — 98941 CHIROPRACT MANJ 3-4 REGIONS: CPT | Mod: AT | Performed by: CHIROPRACTOR

## 2018-11-20 NOTE — PROGRESS NOTES
VISIT 10 2018    SUBJECTIVE:  Thierry RTC with a slight improvement in neck and back pain since the last visit. Overall a good month, states having a slight increase stiffness throughout his neck,and lower back in the last week. States having a weekly massage with [facility]. Also is doing acupuncture. Patient reports overall improvement. Patient reports after recent plane flight and travel having a slight increase in neck and lower back pain.     OBJECTIVE:  C2-3, T5-6 and left and right SIJ dysfunction.   Hypertonic c/t paraspinals, L/S paraspinals B 2/5  ASSESSMENT:   3-region spinal pain   stable  PLAN:   CMT - prone to the mid thoracic, drop x1 to the right and left SIJ, supine to the cervical region  PRN care plan.

## 2018-12-20 ENCOUNTER — THERAPY VISIT (OUTPATIENT)
Dept: CHIROPRACTIC MEDICINE | Facility: CLINIC | Age: 63
End: 2018-12-20
Payer: COMMERCIAL

## 2018-12-20 DIAGNOSIS — M54.2 CERVICALGIA: Primary | ICD-10-CM

## 2018-12-20 DIAGNOSIS — M99.02 THORACIC SEGMENT DYSFUNCTION: ICD-10-CM

## 2018-12-20 DIAGNOSIS — M54.50 LUMBAGO: ICD-10-CM

## 2018-12-20 PROCEDURE — 98941 CHIROPRACT MANJ 3-4 REGIONS: CPT | Mod: AT | Performed by: CHIROPRACTOR

## 2018-12-20 NOTE — PROGRESS NOTES
VISIT 11 2018    SUBJECTIVE:  Thierry RTC with a slight improvement in neck and back pain since the last visit. Overall a good month, states having a slight increase stiffness throughout his neck,and lower back in the last week. States having a weekly massage with [facility]. Also is doing acupuncture. Patient reports overall improvement. Patient reports after recent plane flight and travel having a slight increase in neck and lower back pain.     OBJECTIVE:  C2-3, T5-6 and left and right SIJ dysfunction.   Hypertonic c/t paraspinals, L/S paraspinals B 2/5  ASSESSMENT:   3-region spinal pain   stable  PLAN:   CMT - prone to the mid thoracic, drop x1 to the right and left SIJ, supine to the cervical region  PRN care plan.

## 2019-01-17 ENCOUNTER — THERAPY VISIT (OUTPATIENT)
Dept: CHIROPRACTIC MEDICINE | Facility: CLINIC | Age: 64
End: 2019-01-17
Payer: COMMERCIAL

## 2019-01-17 DIAGNOSIS — M54.2 CERVICALGIA: Primary | ICD-10-CM

## 2019-01-17 DIAGNOSIS — M54.50 CHRONIC BILATERAL LOW BACK PAIN WITHOUT SCIATICA: ICD-10-CM

## 2019-01-17 DIAGNOSIS — M99.02 THORACIC SEGMENT DYSFUNCTION: ICD-10-CM

## 2019-01-17 DIAGNOSIS — G89.29 CHRONIC BILATERAL LOW BACK PAIN WITHOUT SCIATICA: ICD-10-CM

## 2019-01-17 PROCEDURE — 98941 CHIROPRACT MANJ 3-4 REGIONS: CPT | Mod: AT | Performed by: CHIROPRACTOR

## 2019-01-17 NOTE — PROGRESS NOTES
VISIT 12 2018    SUBJECTIVE:  Thierry RTC with a slight improvement in neck and back pain since the last visit. Overall a good month, states having improvement with stiffness throughout his neck,and lower back. States having a monthly massage with [facility]. Also is doing acupuncture. Patient reports overall improvement.      OBJECTIVE:  C2-3, T5-6 and left and right SIJ dysfunction.   Hypertonic c/t paraspinals, L/S paraspinals B 2/5  ASSESSMENT:   3-region spinal pain   stable  PLAN:   CMT - prone to the mid thoracic, drop x1 to the right and left SIJ, supine to the cervical region  PRN care plan.

## 2019-02-21 ENCOUNTER — THERAPY VISIT (OUTPATIENT)
Dept: CHIROPRACTIC MEDICINE | Facility: CLINIC | Age: 64
End: 2019-02-21
Payer: COMMERCIAL

## 2019-02-21 DIAGNOSIS — G89.29 CHRONIC BILATERAL LOW BACK PAIN WITHOUT SCIATICA: ICD-10-CM

## 2019-02-21 DIAGNOSIS — M99.02 THORACIC SEGMENT DYSFUNCTION: ICD-10-CM

## 2019-02-21 DIAGNOSIS — M99.01 CERVICAL SEGMENT DYSFUNCTION: Primary | ICD-10-CM

## 2019-02-21 DIAGNOSIS — M54.2 CERVICALGIA: ICD-10-CM

## 2019-02-21 DIAGNOSIS — M54.50 CHRONIC BILATERAL LOW BACK PAIN WITHOUT SCIATICA: ICD-10-CM

## 2019-02-21 PROCEDURE — 98941 CHIROPRACT MANJ 3-4 REGIONS: CPT | Mod: AT | Performed by: CHIROPRACTOR

## 2019-02-21 NOTE — PROGRESS NOTES
VISIT 2 2019    SUBJECTIVE:  Thierry RTC with a slight improvement in neck and back pain since the last visit. Overall a good month, states having improvement with stiffness throughout his neck,and lower back. States having a monthly massage with [facility]. Also is doing acupuncture. Patient reports overall improvement.      OBJECTIVE:  C2-3, T5-6 and left and right SIJ dysfunction.   Hypertonic c/t paraspinals, L/S paraspinals B 2/5  ASSESSMENT:   3-region spinal pain   stable  PLAN:   CMT - prone to the mid thoracic, drop x1 to the right and left SIJ, supine to the cervical region  PRN care plan.

## 2019-04-02 ENCOUNTER — THERAPY VISIT (OUTPATIENT)
Dept: CHIROPRACTIC MEDICINE | Facility: CLINIC | Age: 64
End: 2019-04-02
Payer: COMMERCIAL

## 2019-04-02 DIAGNOSIS — M99.02 THORACIC SEGMENT DYSFUNCTION: ICD-10-CM

## 2019-04-02 DIAGNOSIS — M99.03 SOMATIC DYSFUNCTION OF LUMBAR REGION: ICD-10-CM

## 2019-04-02 DIAGNOSIS — M99.01 CERVICAL SEGMENT DYSFUNCTION: Primary | ICD-10-CM

## 2019-04-02 DIAGNOSIS — M54.2 CERVICALGIA: ICD-10-CM

## 2019-04-02 PROCEDURE — 98941 CHIROPRACT MANJ 3-4 REGIONS: CPT | Mod: AT | Performed by: CHIROPRACTOR

## 2019-04-02 NOTE — PROGRESS NOTES
VISIT 3 2019    SUBJECTIVE:  Thierry RTC with a slight improvement in neck and back pain since the last visit. Overall a good month, states having improvement with stiffness throughout his neck,and lower back. States having a monthly massage with [facility]. Also is doing acupuncture. Patient reports overall improvement.      OBJECTIVE:  C2-3, T5-6 and left and right SIJ dysfunction.   Hypertonic c/t paraspinals, L/S paraspinals B 2/5  ASSESSMENT:   3-region spinal pain   stable  PLAN:   CMT - prone to the mid thoracic, drop x1 to the right and left SIJ, supine to the cervical region  PRN care plan.

## 2019-05-07 ENCOUNTER — THERAPY VISIT (OUTPATIENT)
Dept: CHIROPRACTIC MEDICINE | Facility: CLINIC | Age: 64
End: 2019-05-07
Payer: COMMERCIAL

## 2019-05-07 DIAGNOSIS — M99.01 CERVICAL SEGMENT DYSFUNCTION: Primary | ICD-10-CM

## 2019-05-07 DIAGNOSIS — M54.50 LUMBAGO: ICD-10-CM

## 2019-05-07 DIAGNOSIS — M99.02 THORACIC SEGMENT DYSFUNCTION: ICD-10-CM

## 2019-05-07 DIAGNOSIS — M54.2 CERVICALGIA: ICD-10-CM

## 2019-05-07 PROCEDURE — 98941 CHIROPRACT MANJ 3-4 REGIONS: CPT | Mod: AT | Performed by: CHIROPRACTOR

## 2019-05-07 NOTE — PROGRESS NOTES
VISIT 4 2019    SUBJECTIVE:  Thierry RTC with a slight improvement in neck and back pain since the last visit. Overall a good month, states having improvement with stiffness throughout his neck,and lower back. States having a monthly massage with [facility]. Also is doing acupuncture. Patient reports overall improvement.      OBJECTIVE:  C2-3, T5-6 and left and right SIJ dysfunction.   Hypertonic c/t paraspinals, L/S paraspinals B 2/5  ASSESSMENT:   3-region spinal pain   stable  PLAN:   CMT - prone to the mid thoracic, drop x1 to the right and left SIJ, supine to the cervical region  PRN care plan.

## 2019-06-18 ENCOUNTER — THERAPY VISIT (OUTPATIENT)
Dept: CHIROPRACTIC MEDICINE | Facility: CLINIC | Age: 64
End: 2019-06-18
Payer: COMMERCIAL

## 2019-06-18 DIAGNOSIS — M99.02 THORACIC SEGMENT DYSFUNCTION: ICD-10-CM

## 2019-06-18 DIAGNOSIS — M99.01 CERVICAL SEGMENT DYSFUNCTION: ICD-10-CM

## 2019-06-18 DIAGNOSIS — G89.29 CHRONIC BILATERAL LOW BACK PAIN WITHOUT SCIATICA: ICD-10-CM

## 2019-06-18 DIAGNOSIS — M99.03 SOMATIC DYSFUNCTION OF LUMBAR REGION: Primary | ICD-10-CM

## 2019-06-18 DIAGNOSIS — M54.50 CHRONIC BILATERAL LOW BACK PAIN WITHOUT SCIATICA: ICD-10-CM

## 2019-06-18 DIAGNOSIS — M99.04 SOMATIC DYSFUNCTION OF SACRAL REGION: ICD-10-CM

## 2019-06-18 PROCEDURE — 98941 CHIROPRACT MANJ 3-4 REGIONS: CPT | Mod: AT | Performed by: CHIROPRACTOR

## 2019-06-18 NOTE — PROGRESS NOTES
Visit #:  5/10    Subjective:  Thierry Murrieta is a 63 year old male who is seen in f/u up for: chronic on/off neck and back pain     Data Unavailable.     Since last visit on 5/7/2019,  Thierry Murrieta reports:    Area of chief complaint:  Cervical, Thoracic and Lumbar :  Symptoms are graded at 3/10. The quality is described as stiff, achey, dull.  Motion has increased, but is still not normal, C6, T5, L5,SIJ. Patient feels that they are slightly improved due to a reduction in symptoms. Patient reports neck has been improved. Lower back more sore over the last week again after starting new exercises.     Since last visit the patient feels that they are 20 percent  improved from last visit.       Objective:  The following was observed:    P: palpatory tenderness Lumbar erector spine and Traps Bilaterally  A: static palpation demonstrates intersegmental asymmetry , cervical, thoracic, lumbar, pelvis  R: motion palpation notes restricted motion, C4 , C6 , T5 , T8 , T10, L5  and Sacrum   T: muscle spasm at level(s): Lumbar erector spine, T-spine paraspinal and Traps Bilaterally    Segmental spinal dysfunction/restrictions found at:  C4 , C6 , T4 , T8 , L5  and Sacrum       Assessment:    Diagnoses:    No diagnosis found.    Patient's condition:  Patient had restrictions pre-manipulation    Treatment effectiveness:  Post manipulation there is better intersegmental movement and Patient claims to feel looser post manipulation      Procedures:  CMT:  87575 Chiropractic manipulative treatment 3-4 regions performed   Cervical: Diversified, C4, C6, Supine  Thoracic: Diversified, T5, T8, Prone  Pelvis: Drop Table L5, PSIS Left , PSIS Right , Prone    Modalities:  None performed this visit    Therapeutic procedures:  None    Response to Treatment  Patient tolerated the treatment well today.    Prognosis: Good    Progress towards Goals: Patient is making progress towards the goal.     Recommendations:    Instructions:  ice 20  minutes every other hour as needed    Follow-up:    Return to care in 3-4 weeks if needed. PRN plan.

## 2019-09-19 ENCOUNTER — THERAPY VISIT (OUTPATIENT)
Dept: CHIROPRACTIC MEDICINE | Facility: CLINIC | Age: 64
End: 2019-09-19
Payer: COMMERCIAL

## 2019-09-19 DIAGNOSIS — M54.2 CERVICALGIA: ICD-10-CM

## 2019-09-19 DIAGNOSIS — M99.04 SOMATIC DYSFUNCTION OF SACRAL REGION: ICD-10-CM

## 2019-09-19 DIAGNOSIS — M99.02 THORACIC SEGMENT DYSFUNCTION: ICD-10-CM

## 2019-09-19 DIAGNOSIS — M99.03 SOMATIC DYSFUNCTION OF LUMBAR REGION: Primary | ICD-10-CM

## 2019-09-19 DIAGNOSIS — M99.01 CERVICAL SEGMENT DYSFUNCTION: ICD-10-CM

## 2019-09-19 DIAGNOSIS — M54.50 CHRONIC BILATERAL LOW BACK PAIN WITHOUT SCIATICA: ICD-10-CM

## 2019-09-19 DIAGNOSIS — G89.29 CHRONIC BILATERAL LOW BACK PAIN WITHOUT SCIATICA: ICD-10-CM

## 2019-09-19 PROCEDURE — 98941 CHIROPRACT MANJ 3-4 REGIONS: CPT | Mod: AT | Performed by: CHIROPRACTOR

## 2019-09-19 NOTE — PROGRESS NOTES
Visit #:  6/10    Subjective:  Thierry Murrieta is a 63 year old male who is seen in f/u up for: chronic on/off neck and back pain        Somatic dysfunction of lumbar region  Chronic bilateral low back pain without sciatica  Cervical segment dysfunction  Cervicalgia  Thoracic segment dysfunction  Somatic dysfunction of sacral region.     Since last visit,  Thierry Murrieta reports:    Area of chief complaint:  Cervical, Thoracic and Lumbar :  Symptoms are graded at 3/10. The quality is described as stiff, achey, dull.  Motion has increased, but is still not normal, C6, T5, L5,SIJ. Patient feels that they are slightly improved due to a reduction in symptoms. Patient reports stiffness in the neck area from sitting at the computer. He has been performing household chores which may be contributing. No other notable sx.     Since last visit the patient feels that they are 15 percent  improved from last visit.       Objective:  The following was observed:    P: palpatory tenderness Lumbar erector spine and Traps Bilaterally  A: static palpation demonstrates intersegmental asymmetry , cervical, thoracic, lumbar, pelvis  R: motion palpation notes restricted motion, C4 , C6 , T5 , T8 , T10, L5  and Sacrum   T: muscle spasm at level(s): Lumbar erector spine, T-spine paraspinal and Traps Bilaterally    Segmental spinal dysfunction/restrictions found at:  C4 , C6 , T4 , T8 , L5  and Sacrum       Assessment:    Diagnoses:      1. Somatic dysfunction of lumbar region    2. Chronic bilateral low back pain without sciatica    3. Cervical segment dysfunction    4. Cervicalgia    5. Thoracic segment dysfunction    6. Somatic dysfunction of sacral region        Patient's condition:  Patient had restrictions pre-manipulation    Treatment effectiveness:  Post manipulation there is better intersegmental movement and Patient claims to feel looser post manipulation      Procedures:  CMT:  11726 Chiropractic manipulative treatment 3-4  regions performed   Cervical: Diversified, C4, C6, Supine  Thoracic: Diversified, T5, T8, Prone  Pelvis: Drop Table L5, PSIS Left , PSIS Right , Prone    Modalities:  None performed this visit    Therapeutic procedures:  None    Response to Treatment  Patient tolerated the treatment well today.    Prognosis: Good    Progress towards Goals: Patient is making progress towards the goal.     Recommendations:    Instructions:  ice 20 minutes every other hour as needed    Follow-up:    Return to care in 3-4 weeks if needed. PRN plan.

## 2019-09-30 ENCOUNTER — HEALTH MAINTENANCE LETTER (OUTPATIENT)
Age: 64
End: 2019-09-30

## 2019-10-29 ENCOUNTER — THERAPY VISIT (OUTPATIENT)
Dept: CHIROPRACTIC MEDICINE | Facility: CLINIC | Age: 64
End: 2019-10-29
Payer: COMMERCIAL

## 2019-10-29 DIAGNOSIS — M54.2 CERVICALGIA: ICD-10-CM

## 2019-10-29 DIAGNOSIS — M99.01 CERVICAL SEGMENT DYSFUNCTION: Primary | ICD-10-CM

## 2019-10-29 DIAGNOSIS — G89.29 CHRONIC BILATERAL LOW BACK PAIN WITHOUT SCIATICA: ICD-10-CM

## 2019-10-29 DIAGNOSIS — M54.50 CHRONIC BILATERAL LOW BACK PAIN WITHOUT SCIATICA: ICD-10-CM

## 2019-10-29 DIAGNOSIS — M99.02 THORACIC SEGMENT DYSFUNCTION: ICD-10-CM

## 2019-10-29 PROCEDURE — 98941 CHIROPRACT MANJ 3-4 REGIONS: CPT | Mod: AT | Performed by: CHIROPRACTOR

## 2019-10-29 NOTE — PROGRESS NOTES
Visit #:  7/10    Subjective:  Thierry Murrieta is a 63 year old male who is seen in f/u up for: chronic on/off neck and back pain     Data Unavailable.     Since last visit on 9/19/19,  Thierry Murrieta reports:    Area of chief complaint:  Cervical, Thoracic and Lumbar :  Symptoms are graded at 3/10. The quality is described as stiff, achey, dull.  Motion has increased, but is still not normal, C6, T5, L5,SIJ. Patient feels that they are slightly improved due to a reduction in symptoms. Patient reports neck and lower back have been improving.      Since last visit the patient feels that they are 20 percent  improved from last visit.       Objective:  The following was observed:    P: palpatory tenderness Lumbar erector spine and Traps Bilaterally  A: static palpation demonstrates intersegmental asymmetry , cervical, thoracic, lumbar, pelvis  R: motion palpation notes restricted motion, C4 , C6 , T5 , T8 , T10, L5  and Sacrum   T: muscle spasm at level(s): Lumbar erector spine, T-spine paraspinal and Traps Bilaterally    Segmental spinal dysfunction/restrictions found at:  C4 , C6 , T4 , T8 , L5  and Sacrum       Assessment:    Diagnoses:    No diagnosis found.    Patient's condition:  Patient had restrictions pre-manipulation    Treatment effectiveness:  Post manipulation there is better intersegmental movement and Patient claims to feel looser post manipulation      Procedures:  CMT:  34395 Chiropractic manipulative treatment 3-4 regions performed   Cervical: Activator, C4, C6, Supine  Thoracic: Diversified/Activator, T5, T8, Prone  Pelvis: Drop Table L5, PSIS Left , PSIS Right , Prone    Modalities:  None performed this visit    Therapeutic procedures:  None    Response to Treatment  Patient tolerated the treatment well today.    Prognosis: Good    Progress towards Goals: Patient is making progress towards the goal.     Recommendations:    Instructions:  ice 20 minutes every other hour as needed    Follow-up:     Return to care in 3-4 weeks if needed. PRN plan.

## 2019-11-26 ENCOUNTER — THERAPY VISIT (OUTPATIENT)
Dept: CHIROPRACTIC MEDICINE | Facility: CLINIC | Age: 64
End: 2019-11-26
Payer: COMMERCIAL

## 2019-11-26 DIAGNOSIS — M99.02 THORACIC SEGMENT DYSFUNCTION: ICD-10-CM

## 2019-11-26 DIAGNOSIS — M54.2 CERVICALGIA: ICD-10-CM

## 2019-11-26 DIAGNOSIS — M99.01 CERVICAL SEGMENT DYSFUNCTION: Primary | ICD-10-CM

## 2019-11-26 DIAGNOSIS — M54.50 LUMBAGO: ICD-10-CM

## 2019-11-26 PROCEDURE — 98941 CHIROPRACT MANJ 3-4 REGIONS: CPT | Mod: AT | Performed by: CHIROPRACTOR

## 2020-02-06 ENCOUNTER — THERAPY VISIT (OUTPATIENT)
Dept: CHIROPRACTIC MEDICINE | Facility: CLINIC | Age: 65
End: 2020-02-06
Payer: COMMERCIAL

## 2020-02-06 DIAGNOSIS — G89.29 CHRONIC BILATERAL LOW BACK PAIN WITHOUT SCIATICA: ICD-10-CM

## 2020-02-06 DIAGNOSIS — M99.04 SOMATIC DYSFUNCTION OF SACRAL REGION: ICD-10-CM

## 2020-02-06 DIAGNOSIS — M99.01 CERVICAL SEGMENT DYSFUNCTION: Primary | ICD-10-CM

## 2020-02-06 DIAGNOSIS — M99.02 THORACIC SEGMENT DYSFUNCTION: ICD-10-CM

## 2020-02-06 DIAGNOSIS — M54.50 CHRONIC BILATERAL LOW BACK PAIN WITHOUT SCIATICA: ICD-10-CM

## 2020-02-06 DIAGNOSIS — M54.2 CERVICALGIA: ICD-10-CM

## 2020-02-06 PROCEDURE — 98941 CHIROPRACT MANJ 3-4 REGIONS: CPT | Mod: AT | Performed by: CHIROPRACTOR

## 2020-02-06 NOTE — PROGRESS NOTES
Visit #:  1/10   2020    Subjective:  Thierry Murrieta is a 63 year old male who is seen in f/u up for: chronic on/off neck and back pain     Data Unavailable.     Since last visit on 10/29/19  Thierry Murrieta reports:    Area of chief complaint:  Cervical, Thoracic and Lumbar :  Symptoms are graded at 3/10. The quality is described as stiff, achey, dull.  Motion has increased, but is still not normal, C6, T5, L5,SIJ. Patient feels that they are slightly improved due to a reduction in symptoms. Patient reports neck and lower back have been improving.  Patient states she will be getting a partial right knee replacement early March 2020.    Since last visit the patient feels that they are 20 percent  improved from last visit.       Objective:  The following was observed:    P: palpatory tenderness Lumbar erector spine and Traps Bilaterally  A: static palpation demonstrates intersegmental asymmetry , cervical, thoracic, lumbar, pelvis  R: motion palpation notes restricted motion, C4 , C6 , T5 , T8 , T10, L5  and Sacrum   T: muscle spasm at level(s): Lumbar erector spine, T-spine paraspinal and Traps Bilaterally    Segmental spinal dysfunction/restrictions found at:  C4 , C6 , T4 , T8 , L5  and Sacrum       Assessment:    Diagnoses:    No diagnosis found.    Patient's condition:  Patient had restrictions pre-manipulation    Treatment effectiveness:  Post manipulation there is better intersegmental movement and Patient claims to feel looser post manipulation      Procedures:  CMT:  92982 Chiropractic manipulative treatment 3-4 regions performed   Cervical: Activator, C4, C6, Supine  Thoracic: Diversified/Activator, T5, T8, Prone  Pelvis: Drop Table L5, PSIS Left , PSIS Right , Prone    Modalities:  None performed this visit    Therapeutic procedures:  None    Response to Treatment  Patient tolerated the treatment well today.    Prognosis: Good    Progress towards Goals: Patient is making progress towards the  goal.     Recommendations:    Instructions:  ice 20 minutes every other hour as needed    Follow-up:    Return to care in 3-4 weeks if needed. PRN plan.

## 2020-06-29 ENCOUNTER — THERAPY VISIT (OUTPATIENT)
Dept: CHIROPRACTIC MEDICINE | Facility: CLINIC | Age: 65
End: 2020-06-29
Payer: COMMERCIAL

## 2020-06-29 DIAGNOSIS — M99.02 THORACIC SEGMENT DYSFUNCTION: ICD-10-CM

## 2020-06-29 DIAGNOSIS — M99.03 SOMATIC DYSFUNCTION OF LUMBAR REGION: ICD-10-CM

## 2020-06-29 DIAGNOSIS — M99.04 SOMATIC DYSFUNCTION OF SACRAL REGION: ICD-10-CM

## 2020-06-29 DIAGNOSIS — M99.01 CERVICAL SEGMENT DYSFUNCTION: Primary | ICD-10-CM

## 2020-06-29 DIAGNOSIS — M54.2 CERVICALGIA: ICD-10-CM

## 2020-06-29 PROCEDURE — 98941 CHIROPRACT MANJ 3-4 REGIONS: CPT | Mod: AT | Performed by: CHIROPRACTOR

## 2020-06-29 NOTE — PROGRESS NOTES
Visit #:  2/10   2020    Subjective:  Thierry Murrieta is a 64 year old male who is seen in f/u up for: chronic on/off neck and back pain     Data Unavailable.     Since last visit on 2/6/20  Thierry Murrieta reports:    Area of chief complaint:  Cervical, Thoracic and Lumbar :  Symptoms are graded at 3/10. The quality is described as stiff, achey, dull.  Motion has increased, but is still not normal, C6, T5, L5,SIJ. Patient feels overall back and neck pain have been gradually increasing over the last 3 months. States with Covid-19 not being able to have massage or chiropractic care.      Objective:  The following was observed:    P: palpatory tenderness Lumbar erector spine and Traps Bilaterally  A: static palpation demonstrates intersegmental asymmetry , cervical, thoracic, lumbar, pelvis  R: motion palpation notes restricted motion, C4 , C6 , T5 , T8 , T10, L5  and Sacrum   T: muscle spasm at level(s): Lumbar erector spine, T-spine paraspinal and Traps Bilaterally    Segmental spinal dysfunction/restrictions found at:  C4 , C6 , T4 , T8 , L5  and Sacrum       Assessment:    Diagnoses:    No diagnosis found.    Patient's condition:  Patient had restrictions pre-manipulation    Treatment effectiveness:  Post manipulation there is better intersegmental movement and Patient claims to feel looser post manipulation      Procedures:  CMT:  73425 Chiropractic manipulative treatment 3-4 regions performed   Cervical: Activator, C4, C6, Supine  Thoracic: Diversified/Activator, T5, T8, Prone  Pelvis: Drop Table L5, PSIS Left , PSIS Right , Prone    Modalities:  None performed this visit    Therapeutic procedures:  None    Response to Treatment  Patient tolerated the treatment well today.    Prognosis: Good    Progress towards Goals: Patient is making progress towards the goal.     Recommendations:    Instructions:  ice 20 minutes every other hour as needed    Follow-up:    Return to care in 3-4 weeks if needed. PRN plan.

## 2020-08-17 ENCOUNTER — THERAPY VISIT (OUTPATIENT)
Dept: CHIROPRACTIC MEDICINE | Facility: CLINIC | Age: 65
End: 2020-08-17
Payer: COMMERCIAL

## 2020-08-17 DIAGNOSIS — M54.50 CHRONIC BILATERAL LOW BACK PAIN WITHOUT SCIATICA: ICD-10-CM

## 2020-08-17 DIAGNOSIS — M99.01 CERVICAL SEGMENT DYSFUNCTION: Primary | ICD-10-CM

## 2020-08-17 DIAGNOSIS — M54.2 CERVICALGIA: ICD-10-CM

## 2020-08-17 DIAGNOSIS — G89.29 CHRONIC BILATERAL LOW BACK PAIN WITHOUT SCIATICA: ICD-10-CM

## 2020-08-17 DIAGNOSIS — M99.02 THORACIC SEGMENT DYSFUNCTION: ICD-10-CM

## 2020-08-17 DIAGNOSIS — M99.03 SOMATIC DYSFUNCTION OF LUMBAR REGION: ICD-10-CM

## 2020-08-17 PROCEDURE — 98941 CHIROPRACT MANJ 3-4 REGIONS: CPT | Mod: AT | Performed by: CHIROPRACTOR

## 2020-08-17 NOTE — PROGRESS NOTES
Visit #:  3/10   2020    Subjective:  Thierry Murrieta is a 64 year old male who is seen in f/u up for: chronic on/off neck and back pain     Data Unavailable.     Since last visit on 6/29/20  Thierry Murrieta reports:    Area of chief complaint:  Cervical, Thoracic and Lumbar :  Symptoms are graded at 3/10. The quality is described as stiff, achey, dull.  Motion has increased, but is still not normal, C6, T5, L5,SIJ. Patient feels overall back and neck pain have been gradually increasing over the last 1-2 weeks again. 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      Objective:  The following was observed:    P: palpatory tenderness Lumbar erector spine and Traps Bilaterally  A: static palpation demonstrates intersegmental asymmetry , cervical, thoracic, lumbar, pelvis  R: motion palpation notes restricted motion, C4 , C6 , T5 , T8 , T10, L5  and Sacrum   T: muscle spasm at level(s): Lumbar erector spine, T-spine paraspinal and Traps Bilaterally    Segmental spinal dysfunction/restrictions found at:  C4 , C6 , T4 , T8 , L5  and Sacrum       Assessment:    Diagnoses:    No diagnosis found.    Patient's condition:  Patient had restrictions pre-manipulation    Treatment effectiveness:  Post manipulation there is better intersegmental movement and Patient claims to feel looser post manipulation      Procedures:  CMT:  86357 Chiropractic manipulative treatment 3-4 regions performed   Cervical: Activator, C4, C6, Supine  Thoracic: Diversified/Activator, T5, T8, Prone  Pelvis: Drop Table L5, PSIS Left , PSIS Right , Prone    Modalities:  None performed this visit    Therapeutic procedures:  None    Response to Treatment  Patient tolerated the treatment well today.    Prognosis: Good    Progress towards Goals: Patient is making progress towards the goal.     Recommendations:    Instructions:  ice 20 minutes every other hour as needed    Follow-up:    Return to care in 3-4  weeks if needed. PRN plan.

## 2020-09-14 ENCOUNTER — THERAPY VISIT (OUTPATIENT)
Dept: CHIROPRACTIC MEDICINE | Facility: CLINIC | Age: 65
End: 2020-09-14
Payer: COMMERCIAL

## 2020-09-14 DIAGNOSIS — M99.01 CERVICAL SEGMENT DYSFUNCTION: Primary | ICD-10-CM

## 2020-09-14 DIAGNOSIS — M99.03 SOMATIC DYSFUNCTION OF LUMBAR REGION: ICD-10-CM

## 2020-09-14 DIAGNOSIS — M54.2 CERVICALGIA: ICD-10-CM

## 2020-09-14 DIAGNOSIS — M99.02 THORACIC SEGMENT DYSFUNCTION: ICD-10-CM

## 2020-09-14 DIAGNOSIS — M54.50 CHRONIC BILATERAL LOW BACK PAIN WITHOUT SCIATICA: ICD-10-CM

## 2020-09-14 DIAGNOSIS — G89.29 CHRONIC BILATERAL LOW BACK PAIN WITHOUT SCIATICA: ICD-10-CM

## 2020-09-14 PROCEDURE — 98941 CHIROPRACT MANJ 3-4 REGIONS: CPT | Mod: AT | Performed by: CHIROPRACTOR

## 2020-09-14 NOTE — PROGRESS NOTES
Visit #:  4/10   2020    Subjective:  Thierry Murrieta is a 64 year old male who is seen in f/u up for: chronic on/off neck and back pain     Data Unavailable.     Since last visit on 8/17/20  Thierry Murrieta reports:    Area of chief complaint:  Cervical, Thoracic and Lumbar :  Symptoms are graded at 3/10. The quality is described as stiff, achey, dull.  Motion has increased, but is still not normal, C6, T5, L5,SIJ. Patient feels overall back and neck pain have been gradually increasing over the last 1-2 weeks again. 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      Objective:  The following was observed:    P: palpatory tenderness Lumbar erector spine and Traps Bilaterally  A: static palpation demonstrates intersegmental asymmetry , cervical, thoracic, lumbar, pelvis  R: motion palpation notes restricted motion, C4 , C6 , T5 , T8 , T10, L5  and Sacrum   T: muscle spasm at level(s): Lumbar erector spine, T-spine paraspinal and Traps Bilaterally    Segmental spinal dysfunction/restrictions found at:  C4 , C6 , T4 , T8 , L5  and Sacrum       Assessment:    Diagnoses:    No diagnosis found.    Patient's condition:  Patient had restrictions pre-manipulation    Treatment effectiveness:  Post manipulation there is better intersegmental movement and Patient claims to feel looser post manipulation      Procedures:  CMT:  74672 Chiropractic manipulative treatment 3-4 regions performed   Cervical: Activator, C4, C6, Supine  Thoracic: Diversified/Activator, T5, T8, Prone  Pelvis: Drop Table L5, PSIS Left , PSIS Right , Prone    Modalities:  None performed this visit    Therapeutic procedures:  None    Response to Treatment  Patient tolerated the treatment well today.    Prognosis: Good    Progress towards Goals: Patient is making progress towards the goal.     Recommendations:    Instructions:  ice 20 minutes every other hour as needed    Follow-up:    Return to care in  3-4 weeks if needed. PRN plan.

## 2020-10-12 ENCOUNTER — THERAPY VISIT (OUTPATIENT)
Dept: CHIROPRACTIC MEDICINE | Facility: CLINIC | Age: 65
End: 2020-10-12
Payer: COMMERCIAL

## 2020-10-12 DIAGNOSIS — M99.02 THORACIC SEGMENT DYSFUNCTION: ICD-10-CM

## 2020-10-12 DIAGNOSIS — M99.01 CERVICAL SEGMENT DYSFUNCTION: ICD-10-CM

## 2020-10-12 DIAGNOSIS — M54.2 CERVICALGIA: ICD-10-CM

## 2020-10-12 DIAGNOSIS — M54.50 CHRONIC RIGHT-SIDED LOW BACK PAIN WITHOUT SCIATICA: ICD-10-CM

## 2020-10-12 DIAGNOSIS — G89.29 CHRONIC RIGHT-SIDED LOW BACK PAIN WITHOUT SCIATICA: ICD-10-CM

## 2020-10-12 DIAGNOSIS — M99.03 SOMATIC DYSFUNCTION OF LUMBAR REGION: Primary | ICD-10-CM

## 2020-10-12 PROCEDURE — 98941 CHIROPRACT MANJ 3-4 REGIONS: CPT | Mod: AT | Performed by: CHIROPRACTOR

## 2020-10-12 NOTE — PROGRESS NOTES
Visit #:  5/10   2020    Subjective:  Thierry Murrieta is a 64 year old male who is seen in f/u up for: chronic on/off neck and back pain     Data Unavailable.     Since last visit on 9/14/20  Thierry Murrieta reports:    Area of chief complaint:  Cervical, Thoracic and Lumbar :  Symptoms are graded at 4/10. The quality is described as stiff, achey, dull.  Motion has increased, but is still not normal, C6, T5, T8, L5,SIJ. Patient feels overall back and neck pain have been gradually increasing over the 3-4 days again. States he was doing a lot of leaf clean-up in the yard and this aggravated the lower right lumbar spine and mid to upper back.     Objective:  The following was observed:    P: palpatory tenderness Lumbar erector spine and Traps Bilaterally  A: static palpation demonstrates intersegmental asymmetry , cervical, thoracic, lumbar, pelvis  R: motion palpation notes restricted motion, C4 , C6 , T5 , T8 , T10, L5  and Sacrum   T: muscle spasm at level(s): Lumbar erector spine, T-spine paraspinal and Traps Bilaterally    Segmental spinal dysfunction/restrictions found at:  C4 , C6 , T4 , T8 , L5  and Sacrum       Assessment:    Diagnoses:    No diagnosis found.    Patient's condition:  Patient had restrictions pre-manipulation    Treatment effectiveness:  Post manipulation there is better intersegmental movement and Patient claims to feel looser post manipulation      Procedures:  CMT:  53834 Chiropractic manipulative treatment 3-4 regions performed   Cervical: Activator, C4, C6, Supine  Thoracic: Diversified/Activator, T5, T8, Prone  Pelvis: Drop Table L5, PSIS Left , PSIS Right , Prone    Modalities:  None performed this visit    Therapeutic procedures:  None    Response to Treatment  Patient tolerated the treatment well today.    Prognosis: Good    Progress towards Goals: Patient is making progress towards the goal.     Recommendations:    Instructions:  ice 20 minutes every other hour as  needed    Follow-up:    Return to care in 3-4 weeks if needed. PRN plan.

## 2020-11-09 ENCOUNTER — THERAPY VISIT (OUTPATIENT)
Dept: CHIROPRACTIC MEDICINE | Facility: CLINIC | Age: 65
End: 2020-11-09
Payer: COMMERCIAL

## 2020-11-09 DIAGNOSIS — M99.02 THORACIC SEGMENT DYSFUNCTION: ICD-10-CM

## 2020-11-09 DIAGNOSIS — M99.01 CERVICAL SEGMENT DYSFUNCTION: Primary | ICD-10-CM

## 2020-11-09 DIAGNOSIS — M54.50 CHRONIC BILATERAL LOW BACK PAIN WITHOUT SCIATICA: ICD-10-CM

## 2020-11-09 DIAGNOSIS — M54.2 CERVICALGIA: ICD-10-CM

## 2020-11-09 DIAGNOSIS — M99.03 SOMATIC DYSFUNCTION OF LUMBAR REGION: ICD-10-CM

## 2020-11-09 DIAGNOSIS — G89.29 CHRONIC BILATERAL LOW BACK PAIN WITHOUT SCIATICA: ICD-10-CM

## 2020-11-09 PROCEDURE — 98941 CHIROPRACT MANJ 3-4 REGIONS: CPT | Mod: AT | Performed by: CHIROPRACTOR

## 2020-11-09 NOTE — PROGRESS NOTES
Visit #:  6/10   2020    Subjective:  Thierry Murrieta is a 64 year old male who is seen in f/u up for: chronic on/off neck and back pain     Data Unavailable.     Since last visit on 10/12/20  Thierry Murrieta reports:    Area of chief complaint:  Cervical, Thoracic and Lumbar :  Symptoms are graded at 4/10. The quality is described as stiff, achey, dull.  Motion has increased, but is still not normal, C6, T5, T8, L5,SIJ. Patient feels overall back and neck pain have been gradually increasing over the 3-4 days again. States he was doing a lot of leaf clean-up in the yard and this aggravated the lower right lumbar spine and mid to upper back.     Objective:  The following was observed:    P: palpatory tenderness Lumbar erector spine and Traps Bilaterally  A: static palpation demonstrates intersegmental asymmetry , cervical, thoracic, lumbar, pelvis  R: motion palpation notes restricted motion, C4 , C6 , T5 , T8 , T10, L5  and Sacrum   T: muscle spasm at level(s): Lumbar erector spine, T-spine paraspinal and Traps Bilaterally    Segmental spinal dysfunction/restrictions found at:  C4 , C6 , T4 , T8 , L5  and Sacrum       Assessment:    Diagnoses:    No diagnosis found.    Patient's condition:  Patient had restrictions pre-manipulation    Treatment effectiveness:  Post manipulation there is better intersegmental movement and Patient claims to feel looser post manipulation      Procedures:  CMT:  45330 Chiropractic manipulative treatment 3-4 regions performed   Cervical: Activator, C4, C6, Supine  Thoracic: Diversified/Activator, T5, T8, Prone  Pelvis: Drop Table L5, PSIS Left , PSIS Right , Prone    Modalities:  None performed this visit    Therapeutic procedures:  None    Response to Treatment  Patient tolerated the treatment well today.    Prognosis: Good    Progress towards Goals: Patient is making progress towards the goal.     Recommendations:    Instructions:  ice 20 minutes every other hour as  needed    Follow-up:    Return to care in 3-4 weeks if needed. PRN plan.

## 2020-12-16 ENCOUNTER — THERAPY VISIT (OUTPATIENT)
Dept: CHIROPRACTIC MEDICINE | Facility: CLINIC | Age: 65
End: 2020-12-16
Payer: COMMERCIAL

## 2020-12-16 DIAGNOSIS — M99.01 CERVICAL SEGMENT DYSFUNCTION: Primary | ICD-10-CM

## 2020-12-16 DIAGNOSIS — M54.2 CERVICALGIA: ICD-10-CM

## 2020-12-16 DIAGNOSIS — M99.03 SOMATIC DYSFUNCTION OF LUMBAR REGION: ICD-10-CM

## 2020-12-16 DIAGNOSIS — M54.50 CHRONIC BILATERAL LOW BACK PAIN WITHOUT SCIATICA: ICD-10-CM

## 2020-12-16 DIAGNOSIS — G89.29 CHRONIC BILATERAL LOW BACK PAIN WITHOUT SCIATICA: ICD-10-CM

## 2020-12-16 DIAGNOSIS — M99.02 THORACIC SEGMENT DYSFUNCTION: ICD-10-CM

## 2020-12-16 PROCEDURE — 98941 CHIROPRACT MANJ 3-4 REGIONS: CPT | Mod: AT | Performed by: CHIROPRACTOR

## 2020-12-16 NOTE — PROGRESS NOTES
Visit #:  7/10   2020    Subjective:  Thierry Murrieta is a 65 year old male who is seen in f/u up for: chronic on/off neck and back pain     Data Unavailable.     Since last visit on 11/9/20  Thierry Murrieta reports:    Area of chief complaint:  Cervical, Thoracic and Lumbar :  Symptoms are graded at 4/10. The quality is described as stiff, achey, dull.  Motion has increased, but is still not normal, C6, T5, T8, L5,SIJ. Patient feels overall back and neck pain have been gradually increasing over the last week. Patient reports he did meet with a surgeon about the back and plantar fascitis pain. States he is trying different shoes which seems to be helping.      Objective:  The following was observed:    P: palpatory tenderness Lumbar erector spine and Traps Bilaterally  A: static palpation demonstrates intersegmental asymmetry , cervical, thoracic, lumbar, pelvis  R: motion palpation notes restricted motion, C4 , C6 , T5 , T8 , T10, L5  and Sacrum   T: muscle spasm at level(s): Lumbar erector spine, T-spine paraspinal and Traps Bilaterally    Segmental spinal dysfunction/restrictions found at:  C4 , C6 , T4 , T8 , L5  and Sacrum       Assessment:    Diagnoses:    No diagnosis found.    Patient's condition:  Patient had restrictions pre-manipulation    Treatment effectiveness:  Post manipulation there is better intersegmental movement and Patient claims to feel looser post manipulation      Procedures:  CMT:  50478 Chiropractic manipulative treatment 3-4 regions performed   Cervical: Activator, C4, C6, Supine  Thoracic: Diversified/Activator, T5, T8, Prone  Pelvis: Drop Table L5, PSIS Left , PSIS Right , Prone    Modalities:  None performed this visit    Therapeutic procedures:  None    Response to Treatment  Patient tolerated the treatment well today.    Prognosis: Good    Progress towards Goals: Patient is making progress towards the goal.     Recommendations:    Instructions:  ice 20 minutes every other hour as  needed    Follow-up:    Return to care in 3-4 weeks if needed. PRN plan.

## 2021-01-13 ENCOUNTER — THERAPY VISIT (OUTPATIENT)
Dept: CHIROPRACTIC MEDICINE | Facility: CLINIC | Age: 66
End: 2021-01-13
Payer: COMMERCIAL

## 2021-01-13 DIAGNOSIS — M99.01 CERVICAL SEGMENT DYSFUNCTION: ICD-10-CM

## 2021-01-13 DIAGNOSIS — M54.50 CHRONIC BILATERAL LOW BACK PAIN WITHOUT SCIATICA: ICD-10-CM

## 2021-01-13 DIAGNOSIS — G89.29 CHRONIC BILATERAL LOW BACK PAIN WITHOUT SCIATICA: ICD-10-CM

## 2021-01-13 DIAGNOSIS — M99.03 SOMATIC DYSFUNCTION OF LUMBAR REGION: Primary | ICD-10-CM

## 2021-01-13 DIAGNOSIS — M99.02 THORACIC SEGMENT DYSFUNCTION: ICD-10-CM

## 2021-01-13 PROCEDURE — 98941 CHIROPRACT MANJ 3-4 REGIONS: CPT | Mod: AT | Performed by: CHIROPRACTOR

## 2021-01-13 NOTE — PROGRESS NOTES
Visit #:  1/10   2021    Subjective:  Thierry Murrieta is a 65 year old male who is seen in f/u up for: chronic on/off neck and back pain     Data Unavailable.     Since last visit on 11/9/20  Thierry Murrieta reports:    Area of chief complaint:  Cervical, Thoracic and Lumbar :  Symptoms are graded at 2-3/10. The quality is described as stiff, achey, dull.  Motion has increased, but is still not normal, C6, T5, T8, L5,SIJ. Patient feels overall back and neck pain have been gradually improving. States recently bought new shoes and is wearing orthotics. Patient reports he feels this is helpful.      Objective:  The following was observed:    P: palpatory tenderness Lumbar erector spine and Traps Bilaterally  A: static palpation demonstrates intersegmental asymmetry , cervical, thoracic, lumbar, pelvis  R: motion palpation notes restricted motion, C4 , C6 , T5 , T8 , T10, L5  and Sacrum   T: muscle spasm at level(s): Lumbar erector spine, T-spine paraspinal and Traps Bilaterally    Segmental spinal dysfunction/restrictions found at:  C4 , C6 , T4 , T8 , L5  and Sacrum       Assessment:    Diagnoses:    No diagnosis found.    Patient's condition:  Patient had restrictions pre-manipulation    Treatment effectiveness:  Post manipulation there is better intersegmental movement and Patient claims to feel looser post manipulation      Procedures:  CMT:  54565 Chiropractic manipulative treatment 3-4 regions performed   Cervical: Activator, C4, C6, Supine  Thoracic: Diversified/Activator, T5, T8, Prone  Pelvis: Drop Table L5, PSIS Left , PSIS Right , Prone    Modalities:  None performed this visit    Therapeutic procedures:  None    Response to Treatment  Patient tolerated the treatment well today.    Prognosis: Good    Progress towards Goals: Patient is making progress towards the goal.     Recommendations:    Instructions:  ice 20 minutes every other hour as needed    Follow-up:    Return to care in 3-4 weeks if needed. PRN  plan.

## 2021-01-15 ENCOUNTER — HEALTH MAINTENANCE LETTER (OUTPATIENT)
Age: 66
End: 2021-01-15

## 2021-02-10 ENCOUNTER — THERAPY VISIT (OUTPATIENT)
Dept: CHIROPRACTIC MEDICINE | Facility: CLINIC | Age: 66
End: 2021-02-10
Payer: COMMERCIAL

## 2021-02-10 DIAGNOSIS — M99.03 SOMATIC DYSFUNCTION OF LUMBAR REGION: Primary | ICD-10-CM

## 2021-02-10 DIAGNOSIS — M54.50 CHRONIC BILATERAL LOW BACK PAIN WITHOUT SCIATICA: ICD-10-CM

## 2021-02-10 DIAGNOSIS — M99.01 CERVICAL SEGMENT DYSFUNCTION: ICD-10-CM

## 2021-02-10 DIAGNOSIS — M99.02 THORACIC SEGMENT DYSFUNCTION: ICD-10-CM

## 2021-02-10 DIAGNOSIS — G89.29 CHRONIC BILATERAL LOW BACK PAIN WITHOUT SCIATICA: ICD-10-CM

## 2021-02-10 PROCEDURE — 98941 CHIROPRACT MANJ 3-4 REGIONS: CPT | Mod: AT | Performed by: CHIROPRACTOR

## 2021-02-10 NOTE — PROGRESS NOTES
Visit #:  2/10   2021    Subjective:  Thierry Murrieta is a 65 year old male who is seen in f/u up for: chronic on/off neck and back pain     Data Unavailable.     Since last visit on 1/13/21  Thierry Murrieta reports:    Area of chief complaint:  Cervical, Thoracic and Lumbar :  Symptoms are graded at 2-3/10. The quality is described as stiff, achey, dull.  Motion has increased, but is still not normal, C6, T5, T8, L5,SIJ. Patient feels overall back and neck pain have been gradually improving. States recently seeing a podiatrist and added a heel lift in his left shoe.     Objective:  The following was observed:    P: palpatory tenderness Lumbar erector spine and Traps Bilaterally  A: static palpation demonstrates intersegmental asymmetry , cervical, thoracic, lumbar, pelvis  R: motion palpation notes restricted motion, C4 , C6 , T5 , T8 , T10, L5  and Sacrum   T: muscle spasm at level(s): Lumbar erector spine, T-spine paraspinal and Traps Bilaterally    Segmental spinal dysfunction/restrictions found at:  C4 , C6 , T4 , T8 , L5  and Sacrum       Assessment:    Diagnoses:    No diagnosis found.    Patient's condition:  Patient had restrictions pre-manipulation    Treatment effectiveness:  Post manipulation there is better intersegmental movement and Patient claims to feel looser post manipulation      Procedures:  CMT:  98908 Chiropractic manipulative treatment 3-4 regions performed   Cervical: Activator, C4, C6, Supine  Thoracic: Diversified/Activator, T5, T8, Prone  Pelvis: Drop Table L5, PSIS Left , PSIS Right , Prone    Modalities:  None performed this visit    Therapeutic procedures:  None    Response to Treatment  Patient tolerated the treatment well today.    Prognosis: Good    Progress towards Goals: Patient is making progress towards the goal.     Recommendations:    Instructions:  ice 20 minutes every other hour as needed    Follow-up:    Return to care in 3-4 weeks if needed. PRN plan.

## 2021-10-24 ENCOUNTER — HEALTH MAINTENANCE LETTER (OUTPATIENT)
Age: 66
End: 2021-10-24

## 2022-02-13 ENCOUNTER — HEALTH MAINTENANCE LETTER (OUTPATIENT)
Age: 67
End: 2022-02-13

## 2022-10-16 ENCOUNTER — HEALTH MAINTENANCE LETTER (OUTPATIENT)
Age: 67
End: 2022-10-16

## 2023-03-26 ENCOUNTER — HEALTH MAINTENANCE LETTER (OUTPATIENT)
Age: 68
End: 2023-03-26